# Patient Record
Sex: FEMALE | Race: WHITE | NOT HISPANIC OR LATINO | Employment: OTHER | URBAN - METROPOLITAN AREA
[De-identification: names, ages, dates, MRNs, and addresses within clinical notes are randomized per-mention and may not be internally consistent; named-entity substitution may affect disease eponyms.]

---

## 2017-06-16 ENCOUNTER — TRANSCRIBE ORDERS (OUTPATIENT)
Dept: ADMINISTRATIVE | Facility: HOSPITAL | Age: 67
End: 2017-06-16

## 2017-06-16 DIAGNOSIS — Z12.31 VISIT FOR SCREENING MAMMOGRAM: Primary | ICD-10-CM

## 2017-09-30 ENCOUNTER — HOSPITAL ENCOUNTER (OUTPATIENT)
Dept: MAMMOGRAPHY | Facility: HOSPITAL | Age: 67
Discharge: HOME | End: 2017-09-30
Attending: OBSTETRICS & GYNECOLOGY | Admitting: OBSTETRICS & GYNECOLOGY

## 2017-09-30 DIAGNOSIS — Z12.31 VISIT FOR SCREENING MAMMOGRAM: ICD-10-CM

## 2017-09-30 PROCEDURE — G0202 SCR MAMMO BI INCL CAD: HCPCS

## 2018-08-06 ENCOUNTER — TRANSCRIBE ORDERS (OUTPATIENT)
Dept: ADMINISTRATIVE | Facility: HOSPITAL | Age: 68
End: 2018-08-06

## 2018-08-06 DIAGNOSIS — Z12.39 SCREENING BREAST EXAMINATION: Primary | ICD-10-CM

## 2018-10-20 ENCOUNTER — HOSPITAL ENCOUNTER (OUTPATIENT)
Dept: MAMMOGRAPHY | Facility: HOSPITAL | Age: 68
Discharge: HOME | End: 2018-10-20
Admitting: FAMILY MEDICINE

## 2018-10-20 DIAGNOSIS — Z12.39 SCREENING BREAST EXAMINATION: ICD-10-CM

## 2018-10-20 PROCEDURE — 77067 SCR MAMMO BI INCL CAD: CPT

## 2018-10-20 PROCEDURE — 77063 BREAST TOMOSYNTHESIS BI: CPT

## 2019-08-27 ENCOUNTER — TRANSCRIBE ORDERS (OUTPATIENT)
Dept: ADMINISTRATIVE | Facility: HOSPITAL | Age: 69
End: 2019-08-27

## 2019-08-27 DIAGNOSIS — Z12.31 VISIT FOR SCREENING MAMMOGRAM: Primary | ICD-10-CM

## 2019-10-03 ENCOUNTER — OFFICE VISIT (OUTPATIENT)
Dept: GASTROENTEROLOGY | Facility: CLINIC | Age: 69
End: 2019-10-03

## 2019-10-03 VITALS
SYSTOLIC BLOOD PRESSURE: 118 MMHG | BODY MASS INDEX: 26.68 KG/M2 | WEIGHT: 145 LBS | TEMPERATURE: 98.3 F | DIASTOLIC BLOOD PRESSURE: 70 MMHG | HEIGHT: 62 IN

## 2019-10-03 DIAGNOSIS — R12 HEARTBURN: ICD-10-CM

## 2019-10-03 DIAGNOSIS — K92.1 BLOOD IN STOOL: Primary | ICD-10-CM

## 2019-10-03 PROCEDURE — 99203 OFFICE O/P NEW LOW 30 MIN: CPT | Performed by: INTERNAL MEDICINE

## 2019-10-03 RX ORDER — URSODIOL 300 MG/1
300 CAPSULE ORAL 2 TIMES DAILY
COMMUNITY

## 2019-10-03 NOTE — PROGRESS NOTES
Subjective   Chief Complaint   Patient presents with   • Rectal Bleeding       Yamilka Nunez is a  69 y.o. female here for a  visit for rectal bleeding.  She had an episode in April and then an episode this past weekend.  She describes it as bright red blood coating the stool.  She has not seen it in the toilet water or in the stool.  She denies any tarry stools.  She is had no abdominal pain or weight loss.  She does have a history of heartburn for which she takes omeprazole.  She has occasional breakthrough symptoms for which she takes Tums.  She denies any difficulty swallowing.  She has no family history of colorectal cancer or polyps.  Her last EGD and colonoscopy were performed in 2013.  EGD showed an irregular Z line and mild gastritis.  Colonoscopy showed mild sigmoid diverticulosis and grade 1 internal hemorrhoids.   HPI  History reviewed. No pertinent past medical history.  Past Surgical History:   Procedure Laterality Date   • COLONOSCOPY  06/07/2013    Diverticulosis, NBIH.    • HYSTERECTOMY     • REDUCTION MAMMAPLASTY     • UPPER GASTROINTESTINAL ENDOSCOPY  06/07/2013    Z-line, gastritis       Current Outpatient Medications:   •  conjugated estrogens (PREMARIN) 0.625 MG/GM vaginal cream, Insert  into the vagina 2 (Two) Times a Week., Disp: , Rfl:   •  losartan (COZAAR) 50 MG tablet, TAKE 1 TABLET TWICE A DAY, Disp: , Rfl:   •  omeprazole (priLOSEC) 40 MG capsule, Take 20 mg by mouth Daily., Disp: , Rfl:   •  pravastatin (PRAVACHOL) 10 MG tablet, Take 20 mg by mouth Daily., Disp: , Rfl:   •  spironolactone (ALDACTONE) 100 MG tablet, Take 100 mg by mouth Daily., Disp: , Rfl:   •  ursodiol (ACTIGALL) 300 MG capsule, Take 300 mg by mouth 2 (Two) Times a Day., Disp: , Rfl:   •  phenazopyridine (PYRIDIUM) 200 MG tablet, One tablet 3 x's a day as needed for symptoms, Disp: 10 tablet, Rfl: 0  PRN Meds:.  Allergies   Allergen Reactions   • Hydrochlorothiazide Unknown (See Comments)     FAINT   • Augmentin  [Amoxicillin-Pot Clavulanate] Rash   • Latex Rash     Social History     Socioeconomic History   • Marital status: Single     Spouse name: Not on file   • Number of children: Not on file   • Years of education: Not on file   • Highest education level: Not on file   Tobacco Use   • Smoking status: Never Smoker   • Smokeless tobacco: Never Used   Substance and Sexual Activity   • Alcohol use: No     Frequency: Never   • Drug use: No     Family History   Problem Relation Age of Onset   • No Known Problems Mother    • No Known Problems Father    • No Known Problems Sister    • No Known Problems Brother    • No Known Problems Daughter    • No Known Problems Son    • No Known Problems Maternal Grandmother    • No Known Problems Paternal Grandmother    • No Known Problems Maternal Aunt    • No Known Problems Paternal Aunt    • BRCA 1/2 Neg Hx    • Breast cancer Neg Hx    • Colon cancer Neg Hx    • Endometrial cancer Neg Hx    • Ovarian cancer Neg Hx      Review of Systems   Constitutional: Negative for appetite change and unexpected weight change.   HENT: Negative for trouble swallowing.    Gastrointestinal: Positive for blood in stool. Negative for abdominal pain and constipation.   All other systems reviewed and are negative.    Vitals:    10/03/19 0915   BP: 118/70   Temp: 98.3 °F (36.8 °C)         10/03/19  0915   Weight: 65.8 kg (145 lb)       Objective   Physical Exam   Constitutional: She appears well-developed and well-nourished.   HENT:   Head: Normocephalic and atraumatic.   Eyes: No scleral icterus.   Pulmonary/Chest: Effort normal.   Abdominal: Soft. She exhibits no distension.   Neurological: She is alert.   Skin: Skin is warm and dry.   Psychiatric: She has a normal mood and affect.     No images are attached to the encounter.    Assessment/Plan   Diagnoses and all orders for this visit:    Blood in stool  -     Case Request; Standing  -     Case Request    Heartburn  -     Case Request; Standing  -     Case  Request    Other orders  -     ursodiol (ACTIGALL) 300 MG capsule; Take 300 mg by mouth 2 (Two) Times a Day.  -     conjugated estrogens (PREMARIN) 0.625 MG/GM vaginal cream; Insert  into the vagina 2 (Two) Times a Week.  -     Follow Anesthesia Guidelines / Standing Orders; Future  -     Obtain Informed Consent; Future  -     Implement Anesthesia orders day of procedure.; Standing  -     Obtain informed consent; Standing  -     Verify bowel prep was successful; Standing  -     Give tap water enema if bowel prep was insufficient; Standing      Plan:  · Continue omeprazole for now-further recommendations regarding heartburn based on her endoscopic exam  · We will plan for an EGD and colonoscopy for further evaluation of her symptoms

## 2019-10-07 ENCOUNTER — PREP FOR SURGERY (OUTPATIENT)
Dept: OTHER | Facility: HOSPITAL | Age: 69
End: 2019-10-07

## 2019-10-07 ENCOUNTER — ANESTHESIA (OUTPATIENT)
Dept: GASTROENTEROLOGY | Facility: HOSPITAL | Age: 69
End: 2019-10-07

## 2019-10-07 ENCOUNTER — ANESTHESIA EVENT (OUTPATIENT)
Dept: GASTROENTEROLOGY | Facility: HOSPITAL | Age: 69
End: 2019-10-07

## 2019-10-07 ENCOUNTER — HOSPITAL ENCOUNTER (OUTPATIENT)
Facility: HOSPITAL | Age: 69
Setting detail: HOSPITAL OUTPATIENT SURGERY
Discharge: HOME | End: 2019-10-07
Attending: INTERNAL MEDICINE | Admitting: INTERNAL MEDICINE

## 2019-10-07 VITALS
WEIGHT: 142 LBS | OXYGEN SATURATION: 98 % | RESPIRATION RATE: 10 BRPM | HEART RATE: 69 BPM | BODY MASS INDEX: 26.13 KG/M2 | DIASTOLIC BLOOD PRESSURE: 64 MMHG | HEIGHT: 62 IN | SYSTOLIC BLOOD PRESSURE: 123 MMHG | TEMPERATURE: 98.3 F

## 2019-10-07 DIAGNOSIS — K21.00 GASTROESOPHAGEAL REFLUX DISEASE WITH ESOPHAGITIS: Primary | ICD-10-CM

## 2019-10-07 PROCEDURE — 45378 DIAGNOSTIC COLONOSCOPY: CPT | Performed by: INTERNAL MEDICINE

## 2019-10-07 PROCEDURE — S0260 H&P FOR SURGERY: HCPCS | Performed by: INTERNAL MEDICINE

## 2019-10-07 PROCEDURE — 25010000002 PROPOFOL 10 MG/ML EMULSION: Performed by: ANESTHESIOLOGY

## 2019-10-07 PROCEDURE — 43235 EGD DIAGNOSTIC BRUSH WASH: CPT | Performed by: INTERNAL MEDICINE

## 2019-10-07 RX ORDER — PROPOFOL 10 MG/ML
VIAL (ML) INTRAVENOUS AS NEEDED
Status: DISCONTINUED | OUTPATIENT
Start: 2019-10-07 | End: 2019-10-07 | Stop reason: SURG

## 2019-10-07 RX ORDER — OMEPRAZOLE 40 MG/1
40 CAPSULE, DELAYED RELEASE ORAL
Qty: 60 CAPSULE | Refills: 3 | Status: ON HOLD | OUTPATIENT
Start: 2019-10-07 | End: 2020-01-21 | Stop reason: SDUPTHER

## 2019-10-07 RX ORDER — SODIUM CHLORIDE, SODIUM LACTATE, POTASSIUM CHLORIDE, CALCIUM CHLORIDE 600; 310; 30; 20 MG/100ML; MG/100ML; MG/100ML; MG/100ML
30 INJECTION, SOLUTION INTRAVENOUS CONTINUOUS PRN
Status: DISCONTINUED | OUTPATIENT
Start: 2019-10-07 | End: 2019-10-07 | Stop reason: HOSPADM

## 2019-10-07 RX ORDER — LIDOCAINE HYDROCHLORIDE 20 MG/ML
INJECTION, SOLUTION INFILTRATION; PERINEURAL AS NEEDED
Status: DISCONTINUED | OUTPATIENT
Start: 2019-10-07 | End: 2019-10-07 | Stop reason: SURG

## 2019-10-07 RX ADMIN — SODIUM CHLORIDE, POTASSIUM CHLORIDE, SODIUM LACTATE AND CALCIUM CHLORIDE: 600; 310; 30; 20 INJECTION, SOLUTION INTRAVENOUS at 16:30

## 2019-10-07 RX ADMIN — LIDOCAINE HYDROCHLORIDE 100 MG: 20 INJECTION, SOLUTION INFILTRATION; PERINEURAL at 16:10

## 2019-10-07 RX ADMIN — PROPOFOL 450 MG: 10 INJECTION, EMULSION INTRAVENOUS at 16:10

## 2019-10-07 NOTE — ANESTHESIA POSTPROCEDURE EVALUATION
"Patient: Yamilka Nunez    Procedure Summary     Date:  10/07/19 Room / Location:   [facility]    Anesthesia Start:  1605 Anesthesia Stop:  1643    Procedures:       COLONOSCOPY into cecum and TI (N/A )      ESOPHAGOGASTRODUODENOSCOPY (N/A Esophagus) Diagnosis:       Blood in stool      Heartburn      (Blood in stool [K92.1])      (Heartburn [R12])    Surgeon:  Otilia Mast MD Provider:  Venancio Zaldivar MD    Anesthesia Type:  MAC ASA Status:  2          Anesthesia Type: MAC  Last vitals  BP   101/58 (10/07/19 1637)   Temp   36.8 °C (98.3 °F) (10/07/19 1637)   Pulse   79 (10/07/19 1637)   Resp   10 (10/07/19 1544)     SpO2   98 % (10/07/19 1637)     Post Anesthesia Care and Evaluation    Patient location during evaluation: bedside  Patient participation: complete - patient participated  Level of consciousness: awake and alert  Pain management: adequate  Airway patency: patent  Anesthetic complications: No anesthetic complications    Cardiovascular status: acceptable  Respiratory status: acceptable  Hydration status: acceptable    Comments: /58 (BP Location: Left arm, Patient Position: Lying)   Pulse 79   Temp 36.8 °C (98.3 °F) (Oral)   Resp 10   Ht 157.5 cm (62\")   Wt 64.4 kg (142 lb)   SpO2 98%   BMI 25.97 kg/m²       "

## 2019-10-07 NOTE — H&P
[facility]  Pre Procedure History & Physical    Chief Complaint:   Heartburn and rectal bleeding    Subjective     HPI:   Yamilka Nunez is a  69 y.o. female here for a  visit for rectal bleeding.  She had an episode in April and then an episode this past weekend.  She describes it as bright red blood coating the stool.  She has not seen it in the toilet water or in the stool.  She denies any tarry stools.  She is had no abdominal pain or weight loss.  She does have a history of heartburn for which she takes omeprazole.  She has occasional breakthrough symptoms for which she takes Tums.  She denies any difficulty swallowing.  She has no family history of colorectal cancer or polyps.  Her last EGD and colonoscopy were performed in 2013.  EGD showed an irregular Z line and mild gastritis.  Colonoscopy showed mild sigmoid diverticulosis and grade 1 internal hemorrhoids.     Past Medical History:   Past Medical History:   Diagnosis Date   • Fatty liver    • GERD (gastroesophageal reflux disease)    • Hyperlipidemia    • Hypertension        Past Surgical History:  Past Surgical History:   Procedure Laterality Date   • CHOLECYSTECTOMY OPEN Right 2007   • COLONOSCOPY  06/07/2013    Diverticulosis, NBIH.    • EYE SURGERY Bilateral 09/2016    cataracks   • HYSTERECTOMY     • REDUCTION MAMMAPLASTY     • UPPER GASTROINTESTINAL ENDOSCOPY  06/07/2013    Z-line, gastritis       Family History:  Family History   Problem Relation Age of Onset   • No Known Problems Mother    • No Known Problems Father    • No Known Problems Sister    • No Known Problems Brother    • No Known Problems Daughter    • No Known Problems Son    • No Known Problems Maternal Grandmother    • No Known Problems Paternal Grandmother    • No Known Problems Maternal Aunt    • No Known Problems Paternal Aunt    • BRCA 1/2 Neg Hx    • Breast cancer Neg Hx    • Colon cancer Neg Hx    • Endometrial cancer Neg Hx    • Ovarian cancer Neg Hx   "      Social History:   reports that she has never smoked. She has never used smokeless tobacco. She reports that she does not drink alcohol or use drugs.    Medications:   Medications Prior to Admission   Medication Sig Dispense Refill Last Dose   • conjugated estrogens (PREMARIN) 0.625 MG/GM vaginal cream Insert  into the vagina 2 (Two) Times a Week.   Past Week at Unknown time   • losartan (COZAAR) 50 MG tablet TAKE 1 TABLET TWICE A DAY   10/7/2019 at Unknown time   • omeprazole (priLOSEC) 40 MG capsule Take 20 mg by mouth Daily.   10/6/2019 at Unknown time   • pravastatin (PRAVACHOL) 10 MG tablet Take 20 mg by mouth Daily.   Past Week at Unknown time   • spironolactone (ALDACTONE) 100 MG tablet Take 100 mg by mouth Daily.   Past Week at Unknown time   • ursodiol (ACTIGALL) 300 MG capsule Take 300 mg by mouth 2 (Two) Times a Day.   Past Week at Unknown time   • phenazopyridine (PYRIDIUM) 200 MG tablet One tablet 3 x's a day as needed for symptoms 10 tablet 0 Not Taking       Allergies:  Hydrochlorothiazide; Augmentin [amoxicillin-pot clavulanate]; and Latex    ROS:    Pertinent items are noted in HPI, all other systems reviewed and negative     Objective     Blood pressure 149/75, pulse 80, temperature 98.5 °F (36.9 °C), temperature source Oral, resp. rate 10, height 157.5 cm (62\"), weight 64.4 kg (142 lb), SpO2 99 %.    Physical Exam   Constitutional: Pt is oriented to person, place, and time and well-developed, well-nourished, and in no distress.   Mouth/Throat: Oropharynx is clear and moist.   Neck: Normal range of motion.   Cardiovascular: Normal rate, regular rhythm    Pulmonary/Chest: Effort normal    Abdominal: Soft. Nontender  Skin: Skin is warm and dry.   Psychiatric: Mood, memory, affect and judgment normal.     Assessment/Plan     Diagnosis:  Heartburn and rectal bleeding    Anticipated Surgical Procedure:  egd/colonoscopy    The risks, benefits, and alternatives of this procedure have been discussed " with the patient or the responsible party- the patient understands and agrees to proceed.

## 2019-10-08 PROBLEM — K21.00 GASTROESOPHAGEAL REFLUX DISEASE WITH ESOPHAGITIS: Status: ACTIVE | Noted: 2019-10-08

## 2019-11-09 ENCOUNTER — HOSPITAL ENCOUNTER (OUTPATIENT)
Dept: MAMMOGRAPHY | Facility: HOSPITAL | Age: 69
Discharge: HOME | End: 2019-11-09
Admitting: FAMILY MEDICINE

## 2019-11-09 DIAGNOSIS — Z12.31 VISIT FOR SCREENING MAMMOGRAM: ICD-10-CM

## 2019-11-09 PROCEDURE — 77067 SCR MAMMO BI INCL CAD: CPT

## 2019-11-09 PROCEDURE — 77063 BREAST TOMOSYNTHESIS BI: CPT

## 2020-01-21 ENCOUNTER — ANESTHESIA EVENT (OUTPATIENT)
Dept: GASTROENTEROLOGY | Facility: HOSPITAL | Age: 70
End: 2020-01-21

## 2020-01-21 ENCOUNTER — ANESTHESIA (OUTPATIENT)
Dept: GASTROENTEROLOGY | Facility: HOSPITAL | Age: 70
End: 2020-01-21

## 2020-01-21 ENCOUNTER — HOSPITAL ENCOUNTER (OUTPATIENT)
Facility: HOSPITAL | Age: 70
Setting detail: HOSPITAL OUTPATIENT SURGERY
Discharge: HOME | End: 2020-01-21
Attending: INTERNAL MEDICINE | Admitting: INTERNAL MEDICINE

## 2020-01-21 VITALS
WEIGHT: 136.06 LBS | HEART RATE: 64 BPM | BODY MASS INDEX: 25.04 KG/M2 | OXYGEN SATURATION: 96 % | RESPIRATION RATE: 16 BRPM | HEIGHT: 62 IN | DIASTOLIC BLOOD PRESSURE: 61 MMHG | TEMPERATURE: 97.8 F | SYSTOLIC BLOOD PRESSURE: 111 MMHG

## 2020-01-21 DIAGNOSIS — K21.00 GASTROESOPHAGEAL REFLUX DISEASE WITH ESOPHAGITIS: ICD-10-CM

## 2020-01-21 PROCEDURE — 43239 EGD BIOPSY SINGLE/MULTIPLE: CPT | Performed by: INTERNAL MEDICINE

## 2020-01-21 PROCEDURE — 88305 TISSUE EXAM BY PATHOLOGIST: CPT | Performed by: INTERNAL MEDICINE

## 2020-01-21 PROCEDURE — 25010000002 PROPOFOL 10 MG/ML EMULSION: Performed by: REGISTERED NURSE

## 2020-01-21 PROCEDURE — S0260 H&P FOR SURGERY: HCPCS | Performed by: INTERNAL MEDICINE

## 2020-01-21 RX ORDER — SODIUM CHLORIDE, SODIUM LACTATE, POTASSIUM CHLORIDE, CALCIUM CHLORIDE 600; 310; 30; 20 MG/100ML; MG/100ML; MG/100ML; MG/100ML
30 INJECTION, SOLUTION INTRAVENOUS CONTINUOUS PRN
Status: DISCONTINUED | OUTPATIENT
Start: 2020-01-21 | End: 2020-01-21 | Stop reason: HOSPADM

## 2020-01-21 RX ORDER — OMEPRAZOLE 40 MG/1
CAPSULE, DELAYED RELEASE ORAL
Qty: 90 CAPSULE | Refills: 3 | Status: SHIPPED | OUTPATIENT
Start: 2020-01-21

## 2020-01-21 RX ORDER — LIDOCAINE HYDROCHLORIDE 20 MG/ML
INJECTION, SOLUTION INFILTRATION; PERINEURAL AS NEEDED
Status: DISCONTINUED | OUTPATIENT
Start: 2020-01-21 | End: 2020-01-21 | Stop reason: SURG

## 2020-01-21 RX ORDER — PROPOFOL 10 MG/ML
VIAL (ML) INTRAVENOUS CONTINUOUS PRN
Status: DISCONTINUED | OUTPATIENT
Start: 2020-01-21 | End: 2020-01-21 | Stop reason: SURG

## 2020-01-21 RX ORDER — PROPOFOL 10 MG/ML
VIAL (ML) INTRAVENOUS AS NEEDED
Status: DISCONTINUED | OUTPATIENT
Start: 2020-01-21 | End: 2020-01-21 | Stop reason: SURG

## 2020-01-21 RX ADMIN — LIDOCAINE HYDROCHLORIDE 60 MG: 20 INJECTION, SOLUTION INFILTRATION; PERINEURAL at 08:46

## 2020-01-21 RX ADMIN — PROPOFOL 160 MCG/KG/MIN: 10 INJECTION, EMULSION INTRAVENOUS at 08:46

## 2020-01-21 RX ADMIN — SODIUM CHLORIDE, POTASSIUM CHLORIDE, SODIUM LACTATE AND CALCIUM CHLORIDE 30 ML/HR: 600; 310; 30; 20 INJECTION, SOLUTION INTRAVENOUS at 08:13

## 2020-01-21 RX ADMIN — PROPOFOL 60 MG: 10 INJECTION, EMULSION INTRAVENOUS at 08:46

## 2020-01-21 NOTE — ANESTHESIA POSTPROCEDURE EVALUATION
"Patient: Yamilka Nunez    Procedure Summary     Date:  01/21/20 Room / Location:  [facility] /  [facility]    Anesthesia Start:  0840 Anesthesia Stop:  0859    Procedure:  ESOPHAGOGASTRODUODENOSCOPY WITH COLD BIOPSIES (N/A Esophagus) Diagnosis:       Gastroesophageal reflux disease with esophagitis      (Gastroesophageal reflux disease with esophagitis [K21.0])    Surgeon:  Otilia Mast MD Provider:  Jamie iKrk MD    Anesthesia Type:  MAC ASA Status:  2          Anesthesia Type: MAC    Vitals  Vitals Value Taken Time   /61 1/21/2020  9:15 AM   Temp     Pulse 64 1/21/2020  9:15 AM   Resp 16 1/21/2020  9:15 AM   SpO2 96 % 1/21/2020  9:15 AM           Post Anesthesia Care and Evaluation    Patient location during evaluation: bedside  Patient participation: complete - patient participated  Level of consciousness: awake and alert  Pain management: adequate  Airway patency: patent  Anesthetic complications: No anesthetic complications    Cardiovascular status: acceptable  Respiratory status: acceptable  Hydration status: acceptable    Comments: /61 (BP Location: Left arm, Patient Position: Lying)   Pulse 64   Temp 36.6 °C (97.8 °F) (Oral)   Resp 16   Ht 157.5 cm (62\")   Wt 61.7 kg (136 lb 1 oz)   SpO2 96%   BMI 24.89 kg/m²       "

## 2020-01-21 NOTE — ANESTHESIA PREPROCEDURE EVALUATION
" Anesthesia Evaluation     Patient summary reviewed   no history of anesthetic complications:  NPO Solid Status: > 8 hours  NPO Liquid Status: > 2 hours           Airway   Mallampati: II  TM distance: >3 FB  Neck ROM: full  Dental      Pulmonary     breath sounds clear to auscultation  (-) shortness of breath, not a smoker  Cardiovascular   Exercise tolerance: good (4-7 METS)    Rhythm: regular  Rate: normal    (+) hypertension, murmur (slight. \"Longstanding\" per patient), hyperlipidemia,   (-) angina, FARLEY      Neuro/Psych  GI/Hepatic/Renal/Endo    (+)  GERD,  liver disease fatty liver disease,     Musculoskeletal     (+) myalgias,   Abdominal    Substance History      OB/GYN          Other                        Anesthesia Plan    ASA 2     MAC   (MAC anesthesia discussed with patient and/or patient representative. Risks (including but not limited to intra-op awareness), benefits, and alternatives were discussed. Understanding was voiced with an agreement to proceed with a MAC technique and General as a backup option.   )  intravenous induction     Anesthetic plan, all risks, benefits, and alternatives have been provided, discussed and informed consent has been obtained with: patient.      "

## 2020-01-21 NOTE — H&P
[facility]  Pre Procedure History & Physical    Chief Complaint:   gerd w/esophagitis    Subjective     HPI:   68 yo for repeat egd to ensure healing of esophagitis noted on egd 10/19.she reports that her GERD has improved with bid ppi - she has not required breakthrough meds.  Typically she had more trouble at night    Past Medical History:   Past Medical History:   Diagnosis Date   • Fatty liver    • Fibromyalgia 2011   • GERD (gastroesophageal reflux disease)    • Hyperlipidemia    • Hypertension        Past Surgical History:  Past Surgical History:   Procedure Laterality Date   • CHOLECYSTECTOMY OPEN Right 2007   • COLONOSCOPY  06/07/2013    Diverticulosis, NBIH.    • COLONOSCOPY N/A 10/7/2019    Procedure: COLONOSCOPY into cecum and TI;  Surgeon: Otilia Mast MD;  Location:  [facility];  Service: Gastroenterology   • ENDOSCOPY N/A 10/7/2019    Procedure: ESOPHAGOGASTRODUODENOSCOPY;  Surgeon: Otilia Mast MD;  Location:  [facility];  Service: Gastroenterology   • EYE SURGERY Bilateral 09/2016    cataracks   • HYSTERECTOMY     • REDUCTION MAMMAPLASTY     • UPPER GASTROINTESTINAL ENDOSCOPY  06/07/2013    Z-line, gastritis       Family History:  Family History   Problem Relation Age of Onset   • No Known Problems Mother    • No Known Problems Father    • No Known Problems Sister    • No Known Problems Brother    • No Known Problems Daughter    • No Known Problems Son    • No Known Problems Maternal Grandmother    • No Known Problems Paternal Grandmother    • No Known Problems Maternal Aunt    • No Known Problems Paternal Aunt    • BRCA 1/2 Neg Hx    • Breast cancer Neg Hx    • Colon cancer Neg Hx    • Endometrial cancer Neg Hx    • Ovarian cancer Neg Hx        Social History:   reports that she has never smoked. She has never used smokeless tobacco. She reports that she drinks alcohol. She reports that she does not use drugs.    Medications:   Medications Prior to  "Admission   Medication Sig Dispense Refill Last Dose   • conjugated estrogens (PREMARIN) 0.625 MG/GM vaginal cream Insert  into the vagina 2 (Two) Times a Week.   Past Week at Unknown time   • losartan (COZAAR) 50 MG tablet TAKE 1 TABLET TWICE A DAY   1/21/2020 at 0600   • omeprazole (priLOSEC) 40 MG capsule Take 1 capsule by mouth 2 (Two) Times a Day Before Meals. 60 capsule 3 1/21/2020 at 0600   • pravastatin (PRAVACHOL) 10 MG tablet Take 20 mg by mouth Daily.   1/20/2020 at Unknown time   • spironolactone (ALDACTONE) 100 MG tablet Take 100 mg by mouth Daily.   1/20/2020 at Unknown time   • ursodiol (ACTIGALL) 300 MG capsule Take 300 mg by mouth 2 (Two) Times a Day.   1/20/2020 at Unknown time       Allergies:  Hydrochlorothiazide; Augmentin [amoxicillin-pot clavulanate]; and Latex    ROS:    Pertinent items are noted in HPI, all other systems reviewed and negative     Objective     Blood pressure 127/59, pulse 74, temperature 97.8 °F (36.6 °C), temperature source Oral, resp. rate 16, height 157.5 cm (62\"), weight 61.7 kg (136 lb 1 oz), SpO2 96 %, not currently breastfeeding.    Physical Exam   Constitutional: Pt is oriented to person, place, and time and well-developed, well-nourished, and in no distress.   Mouth/Throat: Oropharynx is clear and moist.   Neck: Normal range of motion.   Cardiovascular: Normal rate, regular rhythm and normal heart sounds.    Pulmonary/Chest: Effort normal and breath sounds normal.   Abdominal: Soft. Nontender  Skin: Skin is warm and dry.   Psychiatric: Mood, memory, affect and judgment normal.     Assessment/Plan     Diagnosis:  gerd w/esophagitis    Anticipated Surgical Procedure:  egd    The risks, benefits, and alternatives of this procedure have been discussed with the patient or the responsible party- the patient understands and agrees to proceed.                                                            "

## 2020-01-22 ENCOUNTER — TELEPHONE (OUTPATIENT)
Dept: GASTROENTEROLOGY | Facility: CLINIC | Age: 70
End: 2020-01-22

## 2020-01-22 LAB
CYTO UR: NORMAL
LAB AP CASE REPORT: NORMAL
LAB AP SPECIAL STAINS: NORMAL
PATH REPORT.FINAL DX SPEC: NORMAL
PATH REPORT.GROSS SPEC: NORMAL

## 2020-01-22 NOTE — TELEPHONE ENCOUNTER
Please let her know that her esophageal biopsies were normal.  No concerning findings or inflammation was seen.  Continue omeprazole once daily prior to evening meal.  Follow-up as needed

## 2020-01-22 NOTE — TELEPHONE ENCOUNTER
Call to pt.  Advise per Dr Mast that esophageal bx were normal.  No concerning findings or inflammation was seen.  Continue omeprazole once daily prior to evening meal.  F/u as needed.  Verb understanding.

## 2020-10-06 ENCOUNTER — TRANSCRIBE ORDERS (OUTPATIENT)
Dept: ADMINISTRATIVE | Facility: HOSPITAL | Age: 70
End: 2020-10-06

## 2020-10-06 DIAGNOSIS — Z12.31 VISIT FOR SCREENING MAMMOGRAM: Primary | ICD-10-CM

## 2021-01-05 ENCOUNTER — HOSPITAL ENCOUNTER (OUTPATIENT)
Dept: MAMMOGRAPHY | Facility: HOSPITAL | Age: 71
Discharge: HOME | End: 2021-01-05
Admitting: FAMILY MEDICINE

## 2021-01-05 DIAGNOSIS — Z12.31 VISIT FOR SCREENING MAMMOGRAM: ICD-10-CM

## 2021-01-05 PROCEDURE — 77067 SCR MAMMO BI INCL CAD: CPT

## 2021-01-05 PROCEDURE — 77063 BREAST TOMOSYNTHESIS BI: CPT

## 2021-01-13 ENCOUNTER — OFFICE VISIT (OUTPATIENT)
Dept: GASTROENTEROLOGY | Facility: CLINIC | Age: 71
End: 2021-01-13

## 2021-01-13 VITALS — TEMPERATURE: 97 F | BODY MASS INDEX: 26.92 KG/M2 | WEIGHT: 146.3 LBS | HEIGHT: 62 IN

## 2021-01-13 DIAGNOSIS — K59.04 CHRONIC IDIOPATHIC CONSTIPATION: ICD-10-CM

## 2021-01-13 DIAGNOSIS — D64.9 ANEMIA, UNSPECIFIED TYPE: ICD-10-CM

## 2021-01-13 DIAGNOSIS — K21.00 GASTROESOPHAGEAL REFLUX DISEASE WITH ESOPHAGITIS WITHOUT HEMORRHAGE: Primary | ICD-10-CM

## 2021-01-13 DIAGNOSIS — R10.84 GENERALIZED ABDOMINAL PAIN: ICD-10-CM

## 2021-01-13 PROCEDURE — 99214 OFFICE O/P EST MOD 30 MIN: CPT | Performed by: INTERNAL MEDICINE

## 2021-01-13 NOTE — PROGRESS NOTES
Subjective   Chief Complaint   Patient presents with   • Heartburn   • Constipation       Yamilka Nunez is a  70 y.o. female here for a follow up visit for constipation, heartburn.  He was last seen in the office in October 2019.    She had an EGD and colonoscopy October 7, 2019-LA grade D erosive esophagitis was noted, diverticulosis and internal hemorrhoids noted on colonoscopy (10-year follow-up recommended).  Repeat EGD January 2020 showed resolution of the previously noted esophagitis.  She has a small hiatal hernia.  It was recommended at that time that she stay on a daily PPI.    She reports that she had a gut infection in Jan - resolved after round of cipro and flagyl - neg CT at the time.    She had recurrence of symptoms last week and went to ER which showed constipation - again given antibiotics.  She had pain only.  Denies n.v.d - no fever or wbc ct either episode.  CT from last week in [place] was reviewed and discussed with the patient.    She reports episodic heartburn despite once daily omeprazole.  She sometimes has breakthrough- 1-2 times     She takes lindsey for a nonspecific liver issue - bx was obtained which was nonspecific.  [facility]  HPI  Past Medical History:   Diagnosis Date   • Fatty liver    • Fibromyalgia 2011   • GERD (gastroesophageal reflux disease)    • Hyperlipidemia    • Hypertension      Past Surgical History:   Procedure Laterality Date   • CHOLECYSTECTOMY OPEN Right 2007   • COLONOSCOPY  06/07/2013    Diverticulosis, NBIH.    • COLONOSCOPY N/A 10/7/2019    Procedure: COLONOSCOPY into cecum and TI;  Surgeon: Otilia Mast MD;  Location: [facility];  Service: Gastroenterology   • ENDOSCOPY N/A 10/7/2019    Procedure: ESOPHAGOGASTRODUODENOSCOPY;  Surgeon: Otilia Mast MD;  Location: [facility] ENDOSCOPY;  Service: Gastroenterology   • ENDOSCOPY N/A 1/21/2020    Procedure: ESOPHAGOGASTRODUODENOSCOPY WITH COLD BIOPSIES;   Surgeon: Otilia Mast MD;  Location: [facility] ENDOSCOPY;  Service: Gastroenterology   • EYE SURGERY Bilateral 09/2016    cataracks   • HYSTERECTOMY     • REDUCTION MAMMAPLASTY     • UPPER GASTROINTESTINAL ENDOSCOPY  06/07/2013    Z-line, gastritis       Current Outpatient Medications:   •  conjugated estrogens (PREMARIN) 0.625 MG/GM vaginal cream, Insert  into the vagina 2 (Two) Times a Week., Disp: , Rfl:   •  losartan (COZAAR) 50 MG tablet, TAKE 1 TABLET TWICE A DAY, Disp: , Rfl:   •  omeprazole (priLOSEC) 40 MG capsule, Take one po daily prior to evening meal, Disp: 90 capsule, Rfl: 3  •  pravastatin (PRAVACHOL) 10 MG tablet, Take 20 mg by mouth Daily., Disp: , Rfl:   •  spironolactone (ALDACTONE) 100 MG tablet, Take 100 mg by mouth Daily., Disp: , Rfl:   •  ursodiol (ACTIGALL) 300 MG capsule, Take 300 mg by mouth 2 (Two) Times a Day., Disp: , Rfl:   PRN Meds:.  Allergies   Allergen Reactions   • Hydrochlorothiazide Unknown (See Comments)     FAINT   • Augmentin [Amoxicillin-Pot Clavulanate] Rash   • Latex Rash     Social History     Socioeconomic History   • Marital status: Single     Spouse name: Not on file   • Number of children: Not on file   • Years of education: Not on file   • Highest education level: Not on file   Tobacco Use   • Smoking status: Never Smoker   • Smokeless tobacco: Never Used   Substance and Sexual Activity   • Alcohol use: Yes     Frequency: Never     Comment: rarely   • Drug use: Never   • Sexual activity: Defer     Family History   Problem Relation Age of Onset   • No Known Problems Mother    • No Known Problems Father    • No Known Problems Sister    • No Known Problems Brother    • No Known Problems Daughter    • No Known Problems Son    • No Known Problems Maternal Grandmother    • No Known Problems Paternal Grandmother    • No Known Problems Maternal Aunt    • No Known Problems Paternal Aunt    • BRCA 1/2 Neg Hx    • Breast cancer Neg Hx    • Colon cancer Neg Hx    •  Endometrial cancer Neg Hx    • Ovarian cancer Neg Hx      Review of Systems   Constitutional: Negative for fever and unexpected weight change.   Gastrointestinal: Positive for abdominal pain. Negative for diarrhea, nausea and vomiting.   All other systems reviewed and are negative.    Vitals:    01/13/21 1256   Temp: 97 °F (36.1 °C)         01/13/21  1256   Weight: 66.4 kg (146 lb 4.8 oz)       Objective   Physical Exam  Constitutional:       Appearance: She is well-developed.   HENT:      Head: Normocephalic and atraumatic.   Eyes:      General: No scleral icterus.  Abdominal:      General: There is no distension.      Palpations: Abdomen is soft.      Tenderness: There is no abdominal tenderness.   Skin:     General: Skin is warm and dry.   Neurological:      Mental Status: She is alert.       No radiology results for the last 7 days    Assessment/Plan   Diagnoses and all orders for this visit:    Gastroesophageal reflux disease with esophagitis without hemorrhage    Generalized abdominal pain    Anemia, unspecified type  -     Ferritin  -     Iron Profile  -     Vitamin B12  -     Folate    Chronic idiopathic constipation      Plan:  · She has had 2 episodes of significant abdominal pain which have warranted ER evaluation-negative work-ups both instances including CT and laboratory.  She was treated with antibiotics both times.  Most recent CT shows some element of constipation.  Will start fiber supplementation daily.  Suspect a functional process is no source or evidence of infection was found.  · She has had significant esophagitis in the past.  We decreased her PPI to once daily and she is doing fairly well with this.  She takes this prior to her evening meal and I recommended that she continue this.  · She has a mild normocytic anemia.  Recent negative GI evaluation with repeat EGD showing resolution of her esophagitis.  Will check iron studies, B12 and folate.

## 2021-01-14 LAB
FERRITIN SERPL-MCNC: 24.4 NG/ML (ref 13–150)
FOLATE SERPL-MCNC: >20 NG/ML (ref 4.78–24.2)
IRON SATN MFR SERPL: 16 % (ref 20–50)
IRON SERPL-MCNC: 77 MCG/DL (ref 37–145)
TIBC SERPL-MCNC: 484 MCG/DL
UIBC SERPL-MCNC: 407 MCG/DL (ref 112–346)
VIT B12 SERPL-MCNC: 523 PG/ML (ref 211–946)

## 2021-01-19 ENCOUNTER — TELEPHONE (OUTPATIENT)
Dept: GASTROENTEROLOGY | Facility: CLINIC | Age: 71
End: 2021-01-19

## 2021-01-19 NOTE — TELEPHONE ENCOUNTER
Please let her know that her labs do not reflect iron deficiency-her B12 and folate levels are also normal

## 2021-07-12 ENCOUNTER — OFFICE VISIT (OUTPATIENT)
Dept: GASTROENTEROLOGY | Facility: CLINIC | Age: 71
End: 2021-07-12

## 2021-07-12 VITALS — TEMPERATURE: 98.7 F | HEIGHT: 62 IN | BODY MASS INDEX: 27.09 KG/M2 | WEIGHT: 147.2 LBS

## 2021-07-12 DIAGNOSIS — K21.00 GASTROESOPHAGEAL REFLUX DISEASE WITH ESOPHAGITIS WITHOUT HEMORRHAGE: Primary | ICD-10-CM

## 2021-07-12 DIAGNOSIS — K59.04 CHRONIC IDIOPATHIC CONSTIPATION: ICD-10-CM

## 2021-07-12 PROCEDURE — 99213 OFFICE O/P EST LOW 20 MIN: CPT | Performed by: INTERNAL MEDICINE

## 2021-07-12 NOTE — PROGRESS NOTES
Subjective   Chief Complaint   Patient presents with   • Follow-up       Yamilka Nunez is a  71 y.o. female here for a follow up visit for constipation, heartburn.  she was last seen in the office in January 2021.    She had an EGD and colonoscopy October 7, 2019-LA grade D erosive esophagitis was noted, diverticulosis and internal hemorrhoids noted on colonoscopy (10-year follow-up recommended).  Repeat EGD January 2020 showed resolution of the previously noted esophagitis.  She has a small hiatal hernia.  It was recommended at that time that she stay on a daily PPI.    At her last visit we started her on a fiber supplement as she had some recent symptoms as well as a CT scan that indicated some element of constipation.  She has been on Benefiber and she reports that her stools are more regular and softer.  She has not had any abdominal pain or issues with constipation or rectal bleeding.    She takes omeprazole 40 mg before dinner.  Acid reflux is well controlled.  She does not have any heartburn.  HPI  Past Medical History:   Diagnosis Date   • Fatty liver    • Fibromyalgia 2011   • GERD (gastroesophageal reflux disease)    • Hyperlipidemia    • Hypertension      Past Surgical History:   Procedure Laterality Date   • CHOLECYSTECTOMY OPEN Right 2007   • COLONOSCOPY  06/07/2013    Diverticulosis, NBIH.    • COLONOSCOPY N/A 10/7/2019    Procedure: COLONOSCOPY into cecum and TI;  Surgeon: Otilia Mast MD;  Location: [facility] ENDOSCOPY;  Service: Gastroenterology   • ENDOSCOPY N/A 10/7/2019    Procedure: ESOPHAGOGASTRODUODENOSCOPY;  Surgeon: Otilia Mast MD;  Location: [facility] ENDOSCOPY;  Service: Gastroenterology   • ENDOSCOPY N/A 1/21/2020    Procedure: ESOPHAGOGASTRODUODENOSCOPY WITH COLD BIOPSIES;  Surgeon: Otilia Mast MD;  Location: [facility] ENDOSCOPY;  Service: Gastroenterology   • EYE SURGERY Bilateral 09/2016    cataracks   • HYSTERECTOMY     • REDUCTION MAMMAPLASTY     • UPPER GASTROINTESTINAL  ENDOSCOPY  06/07/2013    Z-line, gastritis       Current Outpatient Medications:   •  conjugated estrogens (PREMARIN) 0.625 MG/GM vaginal cream, Insert  into the vagina 2 (Two) Times a Week., Disp: , Rfl:   •  losartan (COZAAR) 50 MG tablet, TAKE 1 TABLET TWICE A DAY, Disp: , Rfl:   •  omeprazole (priLOSEC) 40 MG capsule, Take one po daily prior to evening meal, Disp: 90 capsule, Rfl: 3  •  pravastatin (PRAVACHOL) 10 MG tablet, Take 20 mg by mouth Daily., Disp: , Rfl:   •  spironolactone (ALDACTONE) 100 MG tablet, Take 100 mg by mouth Daily., Disp: , Rfl:   •  ursodiol (ACTIGALL) 300 MG capsule, Take 300 mg by mouth 2 (Two) Times a Day., Disp: , Rfl:   PRN Meds:.  Allergies   Allergen Reactions   • Hydrochlorothiazide Unknown (See Comments)     FAINT   • Augmentin [Amoxicillin-Pot Clavulanate] Rash   • Latex Rash     Social History     Socioeconomic History   • Marital status: Single     Spouse name: Not on file   • Number of children: Not on file   • Years of education: Not on file   • Highest education level: Not on file   Tobacco Use   • Smoking status: Never Smoker   • Smokeless tobacco: Never Used   Substance and Sexual Activity   • Alcohol use: Yes     Comment: rarely   • Drug use: Never   • Sexual activity: Defer     Family History   Problem Relation Age of Onset   • No Known Problems Mother    • No Known Problems Father    • No Known Problems Sister    • No Known Problems Brother    • No Known Problems Daughter    • No Known Problems Son    • No Known Problems Maternal Grandmother    • No Known Problems Paternal Grandmother    • No Known Problems Maternal Aunt    • No Known Problems Paternal Aunt    • BRCA 1/2 Neg Hx    • Breast cancer Neg Hx    • Colon cancer Neg Hx    • Endometrial cancer Neg Hx    • Ovarian cancer Neg Hx      Review of Systems   Constitutional: Negative for appetite change and unexpected weight change.   HENT: Negative for trouble swallowing.    Gastrointestinal: Negative for abdominal  pain, blood in stool and constipation.     Vitals:    07/12/21 0908   Temp: 98.7 °F (37.1 °C)         07/12/21  0908   Weight: 66.8 kg (147 lb 3.2 oz)       Objective   Physical Exam  Constitutional:       Appearance: Normal appearance.   Abdominal:      General: There is no distension.   Neurological:      Mental Status: She is alert.       No radiology results for the last 7 days    Assessment/Plan   Diagnoses and all orders for this visit:    Gastroesophageal reflux disease with esophagitis without hemorrhage    Chronic idiopathic constipation      Plan:  · Continue daily fiber supplement  · Heartburn stable on omeprazole 40 mg once daily  · Discussed GERD diet and lifestyle modification  · Repeat colonoscopy in 2029 for screening

## 2021-11-15 ENCOUNTER — TRANSCRIBE ORDERS (OUTPATIENT)
Dept: ADMINISTRATIVE | Facility: HOSPITAL | Age: 71
End: 2021-11-15

## 2021-11-15 DIAGNOSIS — Z12.31 VISIT FOR SCREENING MAMMOGRAM: Primary | ICD-10-CM

## 2022-02-18 ENCOUNTER — HOSPITAL ENCOUNTER (OUTPATIENT)
Dept: MAMMOGRAPHY | Facility: HOSPITAL | Age: 72
Discharge: HOME | End: 2022-02-18
Admitting: FAMILY MEDICINE

## 2022-02-18 DIAGNOSIS — Z12.31 VISIT FOR SCREENING MAMMOGRAM: ICD-10-CM

## 2022-02-18 PROCEDURE — 77067 SCR MAMMO BI INCL CAD: CPT

## 2022-02-18 PROCEDURE — 77063 BREAST TOMOSYNTHESIS BI: CPT

## 2023-01-03 ENCOUNTER — TRANSCRIBE ORDERS (OUTPATIENT)
Dept: ADMINISTRATIVE | Facility: HOSPITAL | Age: 73
End: 2023-01-03
Payer: MEDICARE

## 2023-01-03 DIAGNOSIS — Z12.31 VISIT FOR SCREENING MAMMOGRAM: Primary | ICD-10-CM

## 2023-03-15 ENCOUNTER — HOSPITAL ENCOUNTER (OUTPATIENT)
Dept: MAMMOGRAPHY | Facility: HOSPITAL | Age: 73
Discharge: HOME | End: 2023-03-15
Admitting: FAMILY MEDICINE
Payer: MEDICARE

## 2023-03-15 DIAGNOSIS — Z12.31 VISIT FOR SCREENING MAMMOGRAM: ICD-10-CM

## 2023-03-15 PROCEDURE — 77063 BREAST TOMOSYNTHESIS BI: CPT

## 2023-03-15 PROCEDURE — 77067 SCR MAMMO BI INCL CAD: CPT

## 2023-07-17 ENCOUNTER — TELEPHONE (OUTPATIENT)
Dept: GASTROENTEROLOGY | Facility: CLINIC | Age: 73
End: 2023-07-17

## 2023-07-17 NOTE — TELEPHONE ENCOUNTER
Caller: Yamilka Nunez     Relationship: SELF    Best call back number: [phone]    What is your medical concern? PT REPORTS FINDING BLOOD IN HER STOOL. NO AVAILABLE APPTS UNTIL NOVEMBER. PLEASE CONTACT PT TO ADVISE    How long has this issue been going on? 1 DAY    Is your provider already aware of this issue? NO    Have you been treated for this issue? NO    
Called pt and pt reports seeing some blood in her stool yesterday and she is concerned.  Was able to get pt nadira with Tess HERNANDEZ tomorrow 07/18 at 3p.   Pt verb understanding.   
No

## 2023-11-17 ENCOUNTER — OUTSIDE FACILITY SERVICE (OUTPATIENT)
Dept: GASTROENTEROLOGY | Facility: CLINIC | Age: 73
End: 2023-11-17
Payer: MEDICARE

## 2024-01-23 ENCOUNTER — TRANSCRIBE ORDERS (OUTPATIENT)
Dept: ADMINISTRATIVE | Facility: HOSPITAL | Age: 74
End: 2024-01-23
Payer: MEDICARE

## 2024-01-23 DIAGNOSIS — Z12.31 VISIT FOR SCREENING MAMMOGRAM: Primary | ICD-10-CM

## 2024-03-18 ENCOUNTER — HOSPITAL ENCOUNTER (OUTPATIENT)
Dept: MAMMOGRAPHY | Facility: HOSPITAL | Age: 74
Discharge: HOME | End: 2024-03-18
Admitting: FAMILY MEDICINE
Payer: MEDICARE

## 2024-03-18 DIAGNOSIS — Z12.31 VISIT FOR SCREENING MAMMOGRAM: ICD-10-CM

## 2024-03-18 PROCEDURE — 77063 BREAST TOMOSYNTHESIS BI: CPT

## 2024-03-18 PROCEDURE — 77067 SCR MAMMO BI INCL CAD: CPT

## 2025-02-04 ENCOUNTER — TRANSCRIBE ORDERS (OUTPATIENT)
Dept: ADMINISTRATIVE | Facility: HOSPITAL | Age: 75
End: 2025-02-04
Payer: MEDICARE

## 2025-02-04 DIAGNOSIS — Z12.31 BREAST CANCER SCREENING BY MAMMOGRAM: Primary | ICD-10-CM

## 2025-03-26 ENCOUNTER — HOSPITAL ENCOUNTER (OUTPATIENT)
Dept: MAMMOGRAPHY | Facility: HOSPITAL | Age: 75
Discharge: HOME | End: 2025-03-26
Admitting: FAMILY MEDICINE
Payer: MEDICARE

## 2025-03-26 DIAGNOSIS — Z12.31 BREAST CANCER SCREENING BY MAMMOGRAM: ICD-10-CM

## 2025-03-26 PROCEDURE — 77067 SCR MAMMO BI INCL CAD: CPT

## 2025-03-26 PROCEDURE — 77063 BREAST TOMOSYNTHESIS BI: CPT

## 2025-06-30 ENCOUNTER — OFFICE VISIT (OUTPATIENT)
Dept: GASTROENTEROLOGY | Facility: CLINIC | Age: 75
End: 2025-06-30
Payer: MEDICARE

## 2025-06-30 VITALS
TEMPERATURE: 97.9 F | WEIGHT: 141.6 LBS | DIASTOLIC BLOOD PRESSURE: 73 MMHG | HEART RATE: 71 BPM | HEIGHT: 62 IN | OXYGEN SATURATION: 98 % | SYSTOLIC BLOOD PRESSURE: 112 MMHG | BODY MASS INDEX: 26.06 KG/M2

## 2025-06-30 DIAGNOSIS — K21.9 GASTROESOPHAGEAL REFLUX DISEASE WITHOUT ESOPHAGITIS: Primary | ICD-10-CM

## 2025-06-30 DIAGNOSIS — K44.9 HIATAL HERNIA: ICD-10-CM

## 2025-06-30 PROCEDURE — 1160F RVW MEDS BY RX/DR IN RCRD: CPT | Performed by: INTERNAL MEDICINE

## 2025-06-30 PROCEDURE — 99214 OFFICE O/P EST MOD 30 MIN: CPT | Performed by: INTERNAL MEDICINE

## 2025-06-30 PROCEDURE — 1159F MED LIST DOCD IN RCRD: CPT | Performed by: INTERNAL MEDICINE

## 2025-06-30 RX ORDER — ESOMEPRAZOLE MAGNESIUM 40 MG/1
40 CAPSULE, DELAYED RELEASE ORAL
Qty: 30 CAPSULE | Refills: 1 | Status: SHIPPED | OUTPATIENT
Start: 2025-06-30

## 2025-06-30 NOTE — PROGRESS NOTES
Subjective   Chief Complaint   Patient presents with    Rectal Bleeding       Yamilka Nunez is a  75 y.o. female here for a follow up visit for rectal bleeding.  She was last seen in the office in July 2023.    She reports continued intermittent issues with heartburn.  She reports that she had an episode recently after eating some day-old fried rice she woke in the middle of the night with burning in her chest.  She thought that she had some rust colored material in her stool the next day.  This has not recurred.  She has not seen any other abnormal stool with any suggestion of bleeding.  She is worried about the recurrent nature of her heartburn.  She takes omeprazole around 4 PM.  She watches her diet, she watches her portions, she does not eat close to bedtime.  She had an EGD in 2020 where she had a small hiatal hernia.  No difficulty swallowing.  Bowel movements are good.    Colonoscopy 11/17/2023-this was performed for rectal bleeding.  Diverticulosis and grade 1 nonbleeding internal hemorrhoids were noted.  HPI  Past Medical History:   Diagnosis Date    Fatty liver     Fibromyalgia 2011    GERD (gastroesophageal reflux disease)     Hyperlipidemia     Hypertension      Past Surgical History:   Procedure Laterality Date    CHOLECYSTECTOMY OPEN Right 2007    COLONOSCOPY  06/07/2013    Diverticulosis, NBIH.     COLONOSCOPY N/A 10/07/2019    Procedure: COLONOSCOPY into cecum and TI;  Surgeon: Otilia Mast MD;  Location: [facility] ENDOSCOPY;  Service: Gastroenterology    ENDOSCOPY N/A 10/07/2019    Procedure: ESOPHAGOGASTRODUODENOSCOPY;  Surgeon: Otilia Mast MD;  Location: [facility] ENDOSCOPY;  Service: Gastroenterology    ENDOSCOPY N/A 01/21/2020    Procedure: ESOPHAGOGASTRODUODENOSCOPY WITH COLD BIOPSIES;  Surgeon: Otilia Mast MD;  Location: [facility] ENDOSCOPY;  Service: Gastroenterology    EYE SURGERY Bilateral 09/2016    cataracks    HYSTERECTOMY      REDUCTION MAMMAPLASTY      UPPER  GASTROINTESTINAL ENDOSCOPY  06/07/2013    Z-line, gastritis       Current Outpatient Medications:     Cholecalciferol 125 MCG (5000 UT) tablet, Take 1 tablet by mouth Daily., Disp: , Rfl:     conjugated estrogens (PREMARIN) 0.625 MG/GM vaginal cream, Insert  into the vagina 2 (Two) Times a Week., Disp: , Rfl:     denosumab (PROLIA) 60 MG/ML solution prefilled syringe syringe, Inject 1 mL under the skin into the appropriate area as directed., Disp: , Rfl:     losartan (COZAAR) 50 MG tablet, TAKE 1 TABLET TWICE A DAY, Disp: , Rfl:     pravastatin (PRAVACHOL) 10 MG tablet, Take 2 tablets by mouth Daily., Disp: , Rfl:     spironolactone (ALDACTONE) 100 MG tablet, Take 1 tablet by mouth Daily., Disp: , Rfl:     esomeprazole (nexIUM) 40 MG capsule, Take 1 capsule by mouth Daily Before Supper., Disp: 30 capsule, Rfl: 1  PRN Meds:.  Allergies   Allergen Reactions    Hydrochlorothiazide Unknown (See Comments), Dizziness and Nausea Only     FAINT    Amoxicillin Hives and Unknown (See Comments)    Clavulanic Acid Unknown - High Severity and Unknown (See Comments)     Other reaction(s): Eruption of skin, Urticaria, Eruption of skin, Urticaria   Other reaction(s): Eruption of skin, Urticaria, Eruption of skin, Urticaria    Lisinopril Cough     Other reaction(s): Cough    Amoxicillin-Pot Clavulanate Rash and Hives    Latex Rash     Social History     Socioeconomic History    Marital status: Single   Tobacco Use    Smoking status: Never    Smokeless tobacco: Never   Substance and Sexual Activity    Alcohol use: Yes     Comment: rarely    Drug use: Never    Sexual activity: Defer     Family History   Problem Relation Age of Onset    No Known Problems Mother     No Known Problems Father     No Known Problems Sister     No Known Problems Brother     No Known Problems Daughter     No Known Problems Son     No Known Problems Maternal Grandmother     No Known Problems Paternal Grandmother     No Known Problems Maternal Aunt     No Known  Problems Paternal Aunt     BRCA 1/2 Neg Hx     Breast cancer Neg Hx     Colon cancer Neg Hx     Endometrial cancer Neg Hx     Ovarian cancer Neg Hx      Review of Systems   Constitutional:  Negative for appetite change and unexpected weight change.   HENT:  Negative for trouble swallowing.    Gastrointestinal:  Negative for abdominal pain, constipation and diarrhea.     Vitals:    06/30/25 0936   BP: 112/73   Pulse: 71   Temp: 97.9 °F (36.6 °C)   SpO2: 98%         06/30/25 0936   Weight: 64.2 kg (141 lb 9.6 oz)       Objective   Physical Exam  Constitutional:       Appearance: Normal appearance. She is well-developed.   HENT:      Head: Normocephalic and atraumatic.   Eyes:      General: No scleral icterus.     Conjunctiva/sclera: Conjunctivae normal.   Pulmonary:      Effort: Pulmonary effort is normal.   Abdominal:      General: There is no distension.      Palpations: Abdomen is soft.   Musculoskeletal:      Cervical back: Normal range of motion and neck supple.   Skin:     General: Skin is warm and dry.   Neurological:      Mental Status: She is alert.   Psychiatric:         Mood and Affect: Mood normal.         Behavior: Behavior normal.       Hemoglobin   Date Value Ref Range Status   09/12/2024 11.8 (L) 12.0 - 16.0 g/dL Final   10/02/2023 11.9 (L) 12.0 - 16.0 g/dL Final   07/18/2023 12.4 12.0 - 15.9 g/dL Final   10/11/2022 11.9 (L) 12.0 - 16.0 g/dL Final     Ferritin   Date Value Ref Range Status   02/12/2021 17.5 11.1 - 264.0 ng/mL Final   01/13/2021 24.40 13.00 - 150.00 ng/mL Final     Comment:     Results may be falsely decreased if patient taking Biotin.      Lab Results   Component Value Date    WBC 6.17 09/12/2024    HGB 11.8 (L) 09/12/2024    HCT 38.1 09/12/2024    MCV 99.5 09/12/2024     09/12/2024      No radiology results for the last 7 days    Assessment & Plan   Diagnoses and all orders for this visit:    Gastroesophageal reflux disease without esophagitis    Hiatal hernia    Other  orders  -     denosumab (PROLIA) 60 MG/ML solution prefilled syringe syringe; Inject 1 mL under the skin into the appropriate area as directed.  -     esomeprazole (nexIUM) 40 MG capsule; Take 1 capsule by mouth Daily Before Supper.      Plan:  Heartburn is not well-controlled-recommend trial alternative PPI such as Nexium and if not improved then will switch to voquezna  Continue GERD diet lifestyle modification  I am not convinced based on the description of her symptoms that she is having rectal bleeding-she has had a very recent colonoscopy for the similar symptoms with small internal hemorrhoids.  Recommend close observation for now

## 2025-07-07 ENCOUNTER — TELEPHONE (OUTPATIENT)
Dept: GASTROENTEROLOGY | Facility: CLINIC | Age: 75
End: 2025-07-07
Payer: MEDICARE

## 2025-07-09 NOTE — TELEPHONE ENCOUNTER
Called and spoke with Pharmacy and they said that the patient already came and picked up the generic.

## 2025-07-25 RX ORDER — ESOMEPRAZOLE MAGNESIUM 40 MG/1
40 CAPSULE, DELAYED RELEASE ORAL
Qty: 30 CAPSULE | Refills: 5 | Status: SHIPPED | OUTPATIENT
Start: 2025-07-25

## (undated) DEVICE — CANN NASL CO2 TRULINK W/O2 A/

## (undated) DEVICE — TUBING, SUCTION, 1/4" X 10', STRAIGHT: Brand: MEDLINE

## (undated) DEVICE — SENSR O2 OXIMAX FNGR A/ 18IN NONSTR

## (undated) DEVICE — BITEBLOCK OMNI BLOC

## (undated) DEVICE — KT ORCA ORCAPOD DISP STRL

## (undated) DEVICE — THE TORRENT IRRIGATION SCOPE CONNECTOR IS USED WITH THE TORRENT IRRIGATION TUBING TO PROVIDE IRRIGATION FLUIDS SUCH AS STERILE WATER DURING GASTROINTESTINAL ENDOSCOPIC PROCEDURES WHEN USED IN CONJUNCTION WITH AN IRRIGATION PUMP (OR ELECTROSURGICAL UNIT).: Brand: TORRENT

## (undated) DEVICE — CANN O2 ETCO2 FITS ALL CONN CO2 SMPL A/ 7IN DISP LF

## (undated) DEVICE — KT VLV BIOGUARD SXN BIOP AIR/H20 CONN 4PC DISP

## (undated) DEVICE — Device: Brand: DEFENDO AIR/WATER/SUCTION AND BIOPSY VALVE

## (undated) DEVICE — FRCP BX RADJAW4 NDL 2.8 240CM LG OG BX40

## (undated) DEVICE — LN SMPL CO2 SHTRM SD STREAM W/M LUER

## (undated) DEVICE — ADAPT CLN BIOGUARD AIR/H2O DISP

## (undated) DEVICE — SINGLE-USE BIOPSY FORCEPS: Brand: RADIAL JAW 4